# Patient Record
Sex: FEMALE | URBAN - METROPOLITAN AREA
[De-identification: names, ages, dates, MRNs, and addresses within clinical notes are randomized per-mention and may not be internally consistent; named-entity substitution may affect disease eponyms.]

---

## 2020-01-07 ENCOUNTER — OFFICE (OUTPATIENT)
Dept: URBAN - METROPOLITAN AREA CLINIC 11 | Facility: CLINIC | Age: 85
End: 2020-01-07
Payer: MEDICAID

## 2020-01-07 VITALS
HEART RATE: 85 BPM | DIASTOLIC BLOOD PRESSURE: 92 MMHG | SYSTOLIC BLOOD PRESSURE: 154 MMHG | WEIGHT: 144 LBS | HEIGHT: 63 IN

## 2020-01-07 DIAGNOSIS — Z85.038 PERSONAL HISTORY OF OTHER MALIGNANT NEOPLASM OF LARGE INTEST: ICD-10-CM

## 2020-01-07 DIAGNOSIS — Z43.1 ENCOUNTER FOR ATTENTION TO GASTROSTOMY: ICD-10-CM

## 2020-01-07 PROCEDURE — 99203 OFFICE O/P NEW LOW 30 MIN: CPT | Performed by: INTERNAL MEDICINE

## 2020-01-07 NOTE — SERVICEHPINOTES
Ms. Floyd is an 89 yo female who was diagnosed with colon cancer in December of 2016. She underwent a right hemicolectomy at that time.  She  had a stroke a few days later and required PEG tube placement.  Daughters report the PEG tube did well for about a year and then she started developing worsening aspiration pneumonia.  She had multiple episodes of pneumonia for so IR placed a gastrostomy with J tube extension.  Unfortunately in the last 7 months she has had several issues with the tube.  The tube has been dislodged multiple times and become clogged a couple times.  She has been to [facility] about twice a month for last 7 months to have the tube exchanged or replaced. 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 Her two daughters with her do not know how the PEG tube has been dislodged.

## 2020-01-07 NOTE — SERVICENOTES
I spoke with Dr. Sousa with IR. He recommends an abdominal binder and next time they suture the GJ tube into place.